# Patient Record
Sex: MALE | Race: WHITE | NOT HISPANIC OR LATINO | URBAN - METROPOLITAN AREA
[De-identification: names, ages, dates, MRNs, and addresses within clinical notes are randomized per-mention and may not be internally consistent; named-entity substitution may affect disease eponyms.]

---

## 2017-08-01 ENCOUNTER — LAB REQUISITION (OUTPATIENT)
Dept: LAB | Facility: HOSPITAL | Age: 57
End: 2017-08-01
Payer: COMMERCIAL

## 2017-08-01 DIAGNOSIS — J32.8 OTHER CHRONIC SINUSITIS: ICD-10-CM

## 2017-08-01 PROCEDURE — 87186 SC STD MICRODIL/AGAR DIL: CPT | Performed by: OTOLARYNGOLOGY

## 2017-08-01 PROCEDURE — 87070 CULTURE OTHR SPECIMN AEROBIC: CPT | Performed by: OTOLARYNGOLOGY

## 2017-08-01 PROCEDURE — 87205 SMEAR GRAM STAIN: CPT | Performed by: OTOLARYNGOLOGY

## 2017-08-01 PROCEDURE — 87147 CULTURE TYPE IMMUNOLOGIC: CPT | Performed by: OTOLARYNGOLOGY

## 2017-08-05 LAB
BACTERIA WND AEROBE CULT: ABNORMAL
BACTERIA WND AEROBE CULT: ABNORMAL
GRAM STN SPEC: ABNORMAL
GRAM STN SPEC: ABNORMAL

## 2021-04-08 DIAGNOSIS — Z23 ENCOUNTER FOR IMMUNIZATION: ICD-10-CM

## 2024-09-01 DIAGNOSIS — Z00.6 ENCOUNTER FOR EXAMINATION FOR NORMAL COMPARISON OR CONTROL IN CLINICAL RESEARCH PROGRAM: ICD-10-CM

## 2024-09-05 ENCOUNTER — APPOINTMENT (OUTPATIENT)
Dept: LAB | Facility: CLINIC | Age: 64
End: 2024-09-05

## 2024-09-05 DIAGNOSIS — Z00.6 ENCOUNTER FOR EXAMINATION FOR NORMAL COMPARISON OR CONTROL IN CLINICAL RESEARCH PROGRAM: ICD-10-CM

## 2024-09-05 PROCEDURE — 36415 COLL VENOUS BLD VENIPUNCTURE: CPT

## 2024-09-30 ENCOUNTER — TELEPHONE (OUTPATIENT)
Dept: OTHER | Facility: HOSPITAL | Age: 64
End: 2024-09-30

## 2024-09-30 DIAGNOSIS — R89.8 ABNORMAL GENETIC TEST: Primary | ICD-10-CM

## 2024-09-30 LAB
APOB+LDLR+PCSK9 GENE MUT ANL BLD/T: ABNORMAL
BRCA1+BRCA2 DEL+DUP + FULL MUT ANL BLD/T: NOT DETECTED
GENE DIS ANL INTERP-IMP: POSITIVE
MLH1+MSH2+MSH6+PMS2 GN DEL+DUP+FUL M: NOT DETECTED

## 2024-09-30 NOTE — TELEPHONE ENCOUNTER
Lucas recently participated in the [facility] study and has an actionable result related to FH. He would like a referral to genetic counseling.

## 2024-10-25 ENCOUNTER — TELEPHONE (OUTPATIENT)
Dept: PERINATAL CARE | Facility: CLINIC | Age: 64
End: 2024-10-25

## 2024-10-25 DIAGNOSIS — E78.01 AUTOSOMAL DOMINANT HYPERCHOLESTEROLEMIA ASSOCIATED WITH MUTATION IN APOB GENE: Primary | ICD-10-CM

## 2024-10-25 NOTE — TELEPHONE ENCOUNTER
"[facility] Post-Test Genetic Counseling Note    Patient Name: Dejan Hahn   /Age: 1960/63 y.o.    Date of Service: 10/25/2024  Genetic Counselor: Dyana Shetty  Interpretation Services: None  Location: Telephone consult   Length of Visit:  17 minutes    Dejan presents to the Genetics department to discuss his [facility] genetic test result.    Genetic Testing History: [facility]- [facility] (11 genes): APOB, BRCA1, BRCA2, EPCAM, LDLR, LDLRAP1, MLH1, MSH2, MSH6, PCSK9, PMS2    Report Date: 24      Result: Positive      Variant 1: APOB [id number]  [id number]         Medical and Surgical History  Pertinent surgical history:  COLONOSCOPY   EYE SURGERY   Retinal tear. Hemorrhages   SINUS SURGERY 2017   VASECTOMY   WISDOM TOOTH EXTRACTION     Pertinent medical history:  Allergic rhinitis 1998   Asthma    Benign prostatic hyperplasia   Chronic pain disorder 2014   Disease of thyroid gland 3/20/23   Nodules, possibly cancer   Genital herpes   Hyperlipidemia   Hypertension   Osteoarthritis 2018   Shattered right acetabulum 2014, hurts bad   Varicella     LDL Cholesterol level (24): 165 mg/dl      Other History:  Height:   Ht Readings from Last 1 Encounters:   14 5' 11\" (1.803 m)     Weight:   Wt Readings from Last 1 Encounters:   14 78.2 kg (172 lb 6.4 oz)       Relevant Cardiac [Personal and] Family History*   Lucas states that his father and paternal aunt both have high cholesterol.  His daughter had high cholesterol in her 20s even though she was an avid biker/runner and had a vegan diet.  He is not aware if his son has high cholesterol as he may never have had bloodwork performed.  Lucas himself has known CAD and is being followed by a cardiologist with [facility] who suspected familial hypercholesterolemia.  He is currently taking Nexletol and Repatha as prior use of a statin caused amnesia.  He has had an echocardiogram, ECG, and stress test in  (results in " [place].  Lucas has an upcoming cardiology appointment scheduled for 11/27/24 with his cardiologist.      *All history is reported as provided by the patient; records are not available for review, except where indicated.     Assessment:  Familial Hypercholesterolemia (FH) is a genetic condition that leads to atherosclerotic plaque deposition in the coronary arteries at young ages.   Levels of low-density-lipoprotein cholesterol (LDL-C) progressively worsen over time. If left untreated, it can increase the risk for coronary artery disease, myocardial infarction, xanthomas, and corneal arcus.     Coronary Artery Disease (CAD)   Among individuals with an LDL-C >190 mg/dL (>4.9 mmol/L) and a pathogenic variant in an FH-causing gene, there is a 22-fold increased risk for coronary artery disease compared to a 6-fold increase in the general population with the same LDL-C values (PMID: 89581813).     Myocardial infarction  Studies have found that approximately one in ten individuals with a history of early-onset myocardial infarction have FH, and this risk increased in the setting of a family history of CAD (PMID: 75451470).     Xanthomas   Xanthomas are cholesterol deposits in the tendons of the body due to extremely high levels of LDL-C. They can manifest in the elbow, hands knees, and feet- particularly the Achilles tendon    Some studies identified xanthomas in 30-50% of individuals with FH (PMID: 41899140, 8322726). However, more recent studies showed a lower incidence, likely due to the widespread use of statins (PMID: 93108315).    Corneal arcus   Corneal arcus is a white, gray or blue opaque ring in the corneal margin of the eye. It is caused by abnormal deposition of lipids in the cornea secondary to long-term exposure of elevated LDL-C levels (PMID: 6122313, 21195286, 60872475, 09559224).    Studies have found corneal arcus in 7-30% of individuals with FH (PMID: 1960, 54056950)  This feature does not  resolve with treatment.     Stroke  Recent studies suggest that heterozygous pathogenic variants in FH-causing genes do not confer a significantly increased risk for ischemic stroke (PMID: 60403722, 97912251, 9625639).     Population Frequency  The incidence of hereditary familial hypercholesterolemia in the general population is estimated to be 1/250 (PMID: 52845659,96400515). However, it is estimated to be more common among the Vietnamese [place], [place], [place], and [place] [facility] populations (PMID: 38679239, 18263128, 7535188)    Reproductive Risks   If an individual and their partner both have at least one pathogenic variant in an FH-causing gene, there is a chance that a child would inherit a pathogenic variant in two FH-causing genes.  Individuals with pathogenic variants in two FH-causing genes have a much more severe presentation of the condition.     Risk and Testing for Family Members   We encouraged Dejan to discuss this information with his relatives.     There is a 50% chance that all first-degree relatives (parents, siblings, children) inherited the same pathogenic variant. We discussed that it is possible his daughter also has familial hypercholesterolemia which is causing her high cholesterol; testing for her was encouraged which can be done through [facility] or through a [facility] provider. A family letter will be generated in Dejan's chart to give to relatives.    Early diagnosis and intervention for relatives at risk for FH may reduce morbidity and mortality (PMID: 47620022, 36563235).     Surveillance recommendations for individuals with this condition begin in childhood, typically between the ages of 5-11 (PMID: 15935433, 24867977). Of note, screening recommendations may begin as early as 1 y/o among children with major CAD risk factors (diabetes mellitus, obesity), family history early-onset CAD (<46 y/o in men; <56 y/o in women), or a first-degree relative with a  total cholesterol >240 mg/dL (PMID: 21336239, 94524291). Thus, individuals younger than 18 may consider genetic testing (PMID: 65843963).    Adult relatives should discuss their genetic testing options with their PCP. We recommend that [facility]'s PCP's order the [facility] Test 837655 (GeneSeq®: Cardio - Familial Hypercholesterolemia Panel) for relatives in the network who have not been tested through [facility].      Management  Surveillance Recommendations for Adults  Measurement of lipid levels (TC, LDL-C, HDL-C, triglycerides, lipoprotein)  Interval of follow-up testing TBD by care team based on the presence of other risk factors   Evaluate for concurrent illness (kidney disease, obstructive liver disease, acute myocardial infarction, hypothyroidism) that can affect lipid values.   Consider noninvasive imaging modalities (including echocardiogram, CT angiogram, etc) to inform treatment decision (PMID: 01768631)  Identify modifiable risk factors (smoking, sedentary behavior, hypertension, diabetes, obesity)    Agents and Circumstances to Avoid  Smoking  High intake saturated and trans unsaturated fat  Sedentary lifestyle  Obesity  Hypertension  Diabetes Mellitus     Treatment of Manifestations in Adults with FH  Management guidelines for individuals with FH have been developed by the International Lipid Foundation, American Heart Association and National Lipid Association (PMID: 23852678, 77457958, 98927499). The recommendations listed below are specific to Dejan.     These recommendations are subject to change over time and the newest guidelines should be referenced for the most up to date information.     Hyperlipidemia   Regular physical activity, weight control (if indicated), and healthy diet   -Recommended soluble fiber intake between 10-20g/day and reduced saturated/trans unsaturated fat  -LDL-C levels are typically unable to be lowered with lifestyle interventions alone.   Statin therapy to reduce  elevated LDL-C levels   -Response to treatment, including assessment of liver enzymes, should be assessed one to three months after the start of therapy and periodically thereafter (PMID: 40568031)  Consider referral to lipid specialist with expertise in FH if there is difficulty reducing elevated LDL-C levels.  -Additional treatments such as ezetimibe, bile acid sequestrants, PCSK9 inhibitors, or bempedoic acid may be recommended  -More aggressive treatment may be required among individuals with risk factors such as CAD, diabetes mellitus, smoking, metabolic syndrome, and/or family history of early-onset CAD.     Cardiovascular Disease   Measure serum lipoprotein   Consider low-dose aspirin in those with a history of or high-risk for CAD and/or stroke.  Treatment for diabetes mellitus and hypertension   Consider non-invasive imaging modalities in adults   -There is insufficient research regarding the best way to incorporate subclinical atherosclerosis imaging and exercise stress testing into clinical practice (PMID: 43088823, 51147647)      Additional Genetic Testing Recommendations  [facility] is a research-based population genetic screening initiative. It is not a comprehensive genetic test. It screens for the CDC tier 1 genetic conditions (Damico syndrome, hereditary breast and ovarian cancer syndrome, and FH) only. Patients may be eligible for additional genetic testing for other genetic conditions based on their personal or family history.     We do not have additional genetic testing recommendations for Dejan Hahn.    Positive Result:  Dejan was encouraged to discuss these genetic test results with his PCP as he is part of the [facility] network.  Lucas did agree to a referral to the Lipid clinic at [facility] - they will reach out to him directly to schedule an appointment.

## 2024-10-26 PROBLEM — E78.01 FAMILIAL HYPERCHOLESTEREMIA: Status: ACTIVE | Noted: 2024-10-26
